# Patient Record
Sex: FEMALE | Race: ASIAN | NOT HISPANIC OR LATINO
[De-identification: names, ages, dates, MRNs, and addresses within clinical notes are randomized per-mention and may not be internally consistent; named-entity substitution may affect disease eponyms.]

---

## 2019-03-20 ENCOUNTER — RESULT REVIEW (OUTPATIENT)
Age: 49
End: 2019-03-20

## 2022-07-08 ENCOUNTER — APPOINTMENT (OUTPATIENT)
Dept: ORTHOPEDIC SURGERY | Facility: CLINIC | Age: 52
End: 2022-07-08

## 2022-07-08 VITALS
SYSTOLIC BLOOD PRESSURE: 103 MMHG | BODY MASS INDEX: 29.82 KG/M2 | WEIGHT: 179 LBS | HEART RATE: 90 BPM | HEIGHT: 65 IN | DIASTOLIC BLOOD PRESSURE: 71 MMHG

## 2022-07-08 DIAGNOSIS — M79.604 PAIN IN RIGHT LEG: ICD-10-CM

## 2022-07-08 DIAGNOSIS — M25.551 PAIN IN RIGHT HIP: ICD-10-CM

## 2022-07-08 PROBLEM — Z00.00 ENCOUNTER FOR PREVENTIVE HEALTH EXAMINATION: Status: ACTIVE | Noted: 2022-07-08

## 2022-07-08 PROCEDURE — 73502 X-RAY EXAM HIP UNI 2-3 VIEWS: CPT | Mod: LT

## 2022-07-08 PROCEDURE — 99204 OFFICE O/P NEW MOD 45 MIN: CPT

## 2022-07-08 PROCEDURE — 73562 X-RAY EXAM OF KNEE 3: CPT | Mod: RT

## 2022-07-08 PROCEDURE — 72100 X-RAY EXAM L-S SPINE 2/3 VWS: CPT

## 2022-07-15 ENCOUNTER — OUTPATIENT (OUTPATIENT)
Dept: OUTPATIENT SERVICES | Facility: HOSPITAL | Age: 52
LOS: 1 days | End: 2022-07-15
Payer: COMMERCIAL

## 2022-07-15 ENCOUNTER — APPOINTMENT (OUTPATIENT)
Dept: MRI IMAGING | Facility: CLINIC | Age: 52
End: 2022-07-15

## 2022-07-15 DIAGNOSIS — M25.551 PAIN IN RIGHT HIP: ICD-10-CM

## 2022-07-15 PROCEDURE — 73721 MRI JNT OF LWR EXTRE W/O DYE: CPT

## 2022-07-15 PROCEDURE — 73721 MRI JNT OF LWR EXTRE W/O DYE: CPT | Mod: 26,RT

## 2022-07-29 ENCOUNTER — APPOINTMENT (OUTPATIENT)
Dept: ORTHOPEDIC SURGERY | Facility: CLINIC | Age: 52
End: 2022-07-29

## 2022-09-07 ENCOUNTER — NON-APPOINTMENT (OUTPATIENT)
Age: 52
End: 2022-09-07

## 2022-09-09 ENCOUNTER — APPOINTMENT (OUTPATIENT)
Dept: RADIATION ONCOLOGY | Facility: CLINIC | Age: 52
End: 2022-09-09

## 2022-09-09 DIAGNOSIS — Z78.9 OTHER SPECIFIED HEALTH STATUS: ICD-10-CM

## 2022-09-09 DIAGNOSIS — Z80.42 FAMILY HISTORY OF MALIGNANT NEOPLASM OF PROSTATE: ICD-10-CM

## 2022-09-09 DIAGNOSIS — Z85.72 PERSONAL HISTORY OF NON-HODGKIN LYMPHOMAS: ICD-10-CM

## 2022-09-09 PROCEDURE — 99205 OFFICE O/P NEW HI 60 MIN: CPT | Mod: 25,95

## 2022-09-09 NOTE — VITALS
[Maximal Pain Intensity: 5/10] : 5/10 [Least Pain Intensity: 1/10] : 1/10 [Pain Description/Quality: ___] : Pain description/quality: [unfilled] [Pain Duration: ___] : Pain duration: [unfilled] [Pain Location: ___] : Pain Location: [unfilled] [Opioid] : opioid [80: Normal activity with effort; some signs or symptoms of disease.] : 80: Normal activity with effort; some signs or symptoms of disease.  [ECOG Performance Status: 1 - Restricted in physically strenuous activity but ambulatory and able to carry out work of a light or sedentary nature] : Performance Status: 1 - Restricted in physically strenuous activity but ambulatory and able to carry out work of a light or sedentary nature, e.g., light house work, office work

## 2022-09-10 NOTE — HISTORY OF PRESENT ILLNESS
[FreeTextEntry1] : 51 y/o female with B cell lymphoma presents to discuss radiation treatment options.\par \par Initially pt presented with follicular lymphoma in right neck in 10/2004. h/o chemo in 2004.\par \par 11/2021 -- pathology showed right inguinal LN B cell lymphoma.\par 7/2022 -- PET scan done @ [facility] showed several areas of worsening active malignancy. \par 7/2022 -- MRI showed Impression:\par Extensive marrow signal abnormality of the right proximal femur is likely secondary to red marrow reconversion. Chronic degeneration the labrum. No displaced labral tear. Mild proximal hamstring tendinosis. Pathologically enlarged right external iliac lymph nodes compatible with patient's history of Non-Hodgkin's lymphoma.\par \par Med Onc Dr Farrar @ [facility].\par \par 9/9/22 Radiation consult. c/o right hip / leg pain, on oxycodone PRN. Still on chemo with Dr Farrar\par \par

## 2022-09-10 NOTE — REVIEW OF SYSTEMS
[Negative] : Allergic/Immunologic [FreeTextEntry9] : right hip pain [de-identified] : h/o chemo for lymphoma

## 2022-09-15 ENCOUNTER — OUTPATIENT (OUTPATIENT)
Dept: OUTPATIENT SERVICES | Facility: HOSPITAL | Age: 52
LOS: 1 days | Discharge: HOME | End: 2022-09-15

## 2022-09-15 PROCEDURE — 77334 RADIATION TREATMENT AID(S): CPT | Mod: 26

## 2022-09-15 PROCEDURE — 77290 THER RAD SIMULAJ FIELD CPLX: CPT | Mod: 26

## 2022-09-28 ENCOUNTER — NON-APPOINTMENT (OUTPATIENT)
Age: 52
End: 2022-09-28

## 2022-10-06 ENCOUNTER — NON-APPOINTMENT (OUTPATIENT)
Age: 52
End: 2022-10-06

## 2022-10-09 NOTE — HISTORY OF PRESENT ILLNESS
[FreeTextEntry1] : 53 y/o female with B cell lymphoma presents to discuss radiation treatment options.\par \par Initially pt presented with follicular lymphoma in right neck in 10/2004. h/o chemo in 2004.\par \par 11/2021 -- pathology showed right inguinal LN B cell lymphoma.\par 7/2022 -- PET scan done @ [facility] showed several areas of worsening active malignancy. \par 7/2022 -- MRI showed Impression:\par Extensive marrow signal abnormality of the right proximal femur is likely secondary to red marrow reconversion. Chronic degeneration the labrum. No displaced labral tear. Mild proximal hamstring tendinosis. Pathologically enlarged right external iliac lymph nodes compatible with patient's history of Non-Hodgkin's lymphoma.\par \par Med Onc Dr Farrar @ [facility].\par \par 9/9/22 Radiation consult. c/o right hip / leg pain, on oxycodone PRN. Still on chemo with Dr Farrar\par \par 1/10 fractions tof RT to right neck, 1/5 fractions of RT to right hip completed. No changes from preRT baseline. Still on chemo with Med Onc Dr Farrar @ [facility]. On pain meds from Med Onc PRN.

## 2022-10-09 NOTE — REVIEW OF SYSTEMS
Please f/u with patient. If still symptomatic he can be added SDA upon my return of vacation.    Re-iterate ER precautions [Tinnitus - Grade 0] : Tinnitus - Grade 0 [Blurred Vision: Grade 0] : Blurred Vision: Grade 0 [Mucositis Oral: Grade 0] : Mucositis Oral: Grade 0  [Xerostomia: Grade 0] : Xerostomia: Grade 0 [Oral Pain: Grade 0] : Oral Pain: Grade 0 [Salivary duct inflammation: Grade 0] : Salivary duct inflammation: Grade 0 [Dysgeusia: Grade 0] : Dysgeusia: Grade 0 [Cough: Grade 0] : Cough: Grade 0 [Alopecia: Grade 0] : Alopecia: Grade 0 [Pruritus: Grade 0] : Pruritus: Grade 0 [Skin Atrophy: Grade 0] : Skin Atrophy: Grade 0 [Skin Hyperpigmentation: Grade 0] : Skin Hyperpigmentation: Grade 0 [Skin Induration: Grade 0] : Skin Induration: Grade 0 [Dermatitis Radiation: Grade 0] : Dermatitis Radiation: Grade 0 [Negative] : Allergic/Immunologic [FreeTextEntry9] : right hip pain [de-identified] : h/o chemo for lymphoma

## 2022-10-09 NOTE — REVIEW OF SYSTEMS
[Tinnitus - Grade 0] : Tinnitus - Grade 0 [Blurred Vision: Grade 0] : Blurred Vision: Grade 0 [Mucositis Oral: Grade 0] : Mucositis Oral: Grade 0  [Xerostomia: Grade 0] : Xerostomia: Grade 0 [Oral Pain: Grade 0] : Oral Pain: Grade 0 [Salivary duct inflammation: Grade 0] : Salivary duct inflammation: Grade 0 [Dysgeusia: Grade 0] : Dysgeusia: Grade 0 [Cough: Grade 0] : Cough: Grade 0 [Alopecia: Grade 0] : Alopecia: Grade 0 [Pruritus: Grade 0] : Pruritus: Grade 0 [Skin Atrophy: Grade 0] : Skin Atrophy: Grade 0 [Skin Hyperpigmentation: Grade 0] : Skin Hyperpigmentation: Grade 0 [Skin Induration: Grade 0] : Skin Induration: Grade 0 [Dermatitis Radiation: Grade 0] : Dermatitis Radiation: Grade 0 [Negative] : Allergic/Immunologic [FreeTextEntry9] : right hip pain [de-identified] : h/o chemo for lymphoma

## 2022-10-09 NOTE — DISEASE MANAGEMENT
[Clinical] : TNM Stage: c [IV] : IV [TTNM] : x [NTNM] : x [MTNM] : 1 [de-identified] : 20Gy to right hip, 30 GY to right neck [de-identified] : right hip (20Gy), right neck (30Gy)

## 2022-10-09 NOTE — HISTORY OF PRESENT ILLNESS
[FreeTextEntry1] : 53 y/o female with B cell lymphoma presents to discuss radiation treatment options.\par \par Initially pt presented with follicular lymphoma in right neck in 10/2004. h/o chemo in 2004.\par \par 11/2021 -- pathology showed right inguinal LN B cell lymphoma.\par 7/2022 -- PET scan done @ [facility] showed several areas of worsening active malignancy. \par 7/2022 -- MRI showed Impression:\par Extensive marrow signal abnormality of the right proximal femur is likely secondary to red marrow reconversion. Chronic degeneration the labrum. No displaced labral tear. Mild proximal hamstring tendinosis. Pathologically enlarged right external iliac lymph nodes compatible with patient's history of Non-Hodgkin's lymphoma.\par \par Med Onc Dr Farrar @ [facility].\par \par 9/9/22 Radiation consult. c/o right hip / leg pain, on oxycodone PRN. Still on chemo with Dr Farrar\par \par 8/10 fractions tof RT to right neck, 5/5 fractions of RT to right hip completed. No changes from preRT baseline. Still on chemo with Med Onc Dr Farrar @ [facility]. On pain meds from Med Onc PRN.

## 2022-10-27 ENCOUNTER — APPOINTMENT (OUTPATIENT)
Dept: RADIATION ONCOLOGY | Facility: CLINIC | Age: 52
End: 2022-10-27

## 2022-10-27 PROCEDURE — 99024 POSTOP FOLLOW-UP VISIT: CPT

## 2022-10-27 NOTE — REASON FOR VISIT
[Post-Treatment Evaluation] : post-treatment evaluation for [Other: ___] : [unfilled] [Home] : at home, [unfilled] , at the time of the visit. [Medical Office: ([facility])___] : at the medical office located in  [Verbal consent obtained from patient] : the patient, [unfilled]

## 2022-10-31 NOTE — HISTORY OF PRESENT ILLNESS
[FreeTextEntry1] : 51 y/o female with B cell lymphoma presents to discuss radiation treatment options.\par \par Initially pt presented with follicular lymphoma in right neck in 10/2004. h/o chemo in 2004.\par \par 11/2021 -- pathology showed right inguinal LN B cell lymphoma.\par 7/2022 -- PET scan done @ [facility] showed several areas of worsening active malignancy. \par 7/2022 -- MRI showed Impression:\par Extensive marrow signal abnormality of the right proximal femur is likely secondary to red marrow reconversion. Chronic degeneration the labrum. No displaced labral tear. Mild proximal hamstring tendinosis. Pathologically enlarged right external iliac lymph nodes compatible with patient's history of Non-Hodgkin's lymphoma.\par \par Med Onc Dr Farrar @ [facility].\par \par 9/9/22 Radiation consult. c/o right hip / leg pain, on oxycodone PRN. Still on chemo with Dr Farrar\par \par Last OTV note during RT in 10/2022 : 8/10 fractions tof RT to right neck, 5/5 fractions of RT to right hip completed. No changes from preRT baseline. Still on chemo with Med Onc Dr Farrar @ [facility]. On pain meds from Med Onc PRN. \par \par Today for f/u. Pt completed RT to right hip (20 Gy/ 5Fx), to right neck (30 Gy /10 Fx) in early 10/2022. c/o pain over jaw. Will see another Med Onc Dr Cruz soon.

## 2022-10-31 NOTE — DISEASE MANAGEMENT
[Clinical] : TNM Stage: c [IV] : IV [TTNM] : x [NTNM] : x [MTNM] : 1 [de-identified] : 20Gy to right hip, 30 GY to right neck [de-identified] : right hip (20Gy), right neck (30Gy)

## 2022-10-31 NOTE — REVIEW OF SYSTEMS
[Negative] : Allergic/Immunologic [Tinnitus - Grade 0] : Tinnitus - Grade 0 [Blurred Vision: Grade 0] : Blurred Vision: Grade 0 [Mucositis Oral: Grade 0] : Mucositis Oral: Grade 0  [Xerostomia: Grade 0] : Xerostomia: Grade 0 [Oral Pain: Grade 0] : Oral Pain: Grade 0 [Salivary duct inflammation: Grade 0] : Salivary duct inflammation: Grade 0 [Dysgeusia: Grade 0] : Dysgeusia: Grade 0 [Cough: Grade 0] : Cough: Grade 0 [Alopecia: Grade 0] : Alopecia: Grade 0 [Pruritus: Grade 0] : Pruritus: Grade 0 [Skin Atrophy: Grade 0] : Skin Atrophy: Grade 0 [Skin Hyperpigmentation: Grade 0] : Skin Hyperpigmentation: Grade 0 [Skin Induration: Grade 0] : Skin Induration: Grade 0 [Dermatitis Radiation: Grade 0] : Dermatitis Radiation: Grade 0 [FreeTextEntry9] : right hip pain [de-identified] : h/o chemo for lymphoma

## 2023-02-03 ENCOUNTER — APPOINTMENT (OUTPATIENT)
Dept: RADIATION ONCOLOGY | Facility: CLINIC | Age: 53
End: 2023-02-03